# Patient Record
(demographics unavailable — no encounter records)

---

## 2025-06-26 NOTE — ASSESSMENT
[FreeTextEntry1] : 5-year-old male here accompanied by his parents.  Patient reports he fell 2 weeks ago.  He was seen in the ER where x-rays taken from an acute closed nondisplaced avulsion of the left lateral epicondyle.  He is placed in a cast that which he has been utilizing.  His pain is well-controlled.  Physical examination of the left elbow: Cast fitting the patient well.  Good range of motion of fingers outside of the cast.  Sensation is intact distally.  Neuro vas intact.  X-rays of left elbow reviewed from the hospital system reveal an acute closed nondisplaced avulsion of the left lateral epicondyle of the humerus.  No subluxations or dislocations.  TX: X-rays were discussed in depth.  Cast is fitting the patient well.  Discussed and recommended another 2 to 3 weeks in the cast for optimal healing.  Patient is agreeable.  Cast hygiene was discussed.  Red flag symptoms were discussed.  Will see the patient in 2 to 3 weeks for cast off and repeat x-rays. All questions and concerns addressed to patient's satisfaction. Patient expresses full understanding of treatment plan.

## 2025-07-30 NOTE — ASSESSMENT
[FreeTextEntry1] : Cast removed. Advised to work on moving the arm for 1 week. Follow up in 3-4 months for physeal check.

## 2025-07-30 NOTE — HISTORY OF PRESENT ILLNESS
[FreeTextEntry1] : 4 y/o male here with nondisplaced avulsion fracture of the left lateral malleolus after a fall about 5 weeks ago. Patient has been in cast provided by [facility] since the day of the fall.

## 2025-07-30 NOTE — DATA REVIEWED
[Acceptable Fracture Allignment] : fracture alignment remains acceptable [de-identified] :  3 views of left elbow reviewed.

## 2025-07-30 NOTE — DATA REVIEWED
[Acceptable Fracture Allignment] : fracture alignment remains acceptable [de-identified] :  3 views of left elbow reviewed.

## 2025-07-30 NOTE — HISTORY OF PRESENT ILLNESS
[FreeTextEntry1] : 6 y/o male here with nondisplaced avulsion fracture of the left lateral malleolus after a fall about 5 weeks ago. Patient has been in cast provided by [facility] since the day of the fall.